# Patient Record
Sex: FEMALE | Race: BLACK OR AFRICAN AMERICAN
[De-identification: names, ages, dates, MRNs, and addresses within clinical notes are randomized per-mention and may not be internally consistent; named-entity substitution may affect disease eponyms.]

---

## 2017-02-23 ENCOUNTER — HOSPITAL ENCOUNTER (EMERGENCY)
Dept: HOSPITAL 68 - ERH | Age: 32
LOS: 1 days | End: 2017-02-24
Payer: COMMERCIAL

## 2017-02-23 DIAGNOSIS — J32.9: Primary | ICD-10-CM

## 2017-02-24 VITALS — DIASTOLIC BLOOD PRESSURE: 82 MMHG | SYSTOLIC BLOOD PRESSURE: 112 MMHG

## 2017-02-24 NOTE — ED INFLUENZA/URI COMPLAINT
History of Present Illness
 
General
Chief Complaint: Upper Respiratory Sx/Fever
Stated Complaint: URI SYMPTOMS
Source: patient
Exam Limitations: no limitations
 
Vital Signs & Intake/Output
Vital Signs & Intake/Output
 Vital Signs
 
 
Date Time Temp Pulse Resp B/P Pulse O2 O2 Flow FiO2
 
     Ox Delivery Rate 
 
02/24 0005 97.4 99 16 112/82 97 Room Air  
 
02/23 2231 97.1 102 16 113/80 96 Room Air  
 
 
Allergies
Coded Allergies:
Penicillins (Severe, LARYNGEAL EDEMA 12/19/16)
clindamycin (Severe, LARYNGEAL EDEMA 12/19/16)
doxycycline (Severe, LARYNGEAL EDEMA 12/19/16)
orange juice (MOUTH SWELLING, RASH 12/19/16)
 
Reconcile Medications
Azithromycin (Zithromax) 250 MG TABLET   1 DP PO AD sinsuitis
     2 the first day followed by 1 for days 2-5
Benztropine Mesylate 0.5 MG TABLET   0.5 MG PO 0800,2200 prevent muscle spasm
Citalopram Hydrobromide (Citalopram HBr) 20 MG TABLET   20 MG PO DAILY MENTAL 
HEALTH
Dicyclomine Hydrochloride (Bentyl) 10 MG CAPSULE   10 MG PO TID PRN ABDOMINAL 
PAIN  (Reported)
Gabapentin 300 MG CAPSULE   300 MG PO TID
Lamotrigine 25 MG TABLET   25 MG PO DAILY MENTAL HEALTH
[place] Carbonate 300 MG CAPSULE   300 MG PO 0800 MOOD STABILITY
Lithium Carbonate 600 MG CAPSULE   600 MG PO AT BEDTIME MOOD STABILITY
Mirtazapine (Remeron) 15 MG TABLET   7.5 MG PO AT BEDTIME SLEEP HELP
Mometasone Furoate (Nasonex) 50 MCG SPRAY.PUMP   2 SPRAY NASB DAILY PRN 
congestion
Risperidone (Risperdal) 1 MG TABLET   1 MG PO 0800 MENTAL HEALTH
Risperidone (Risperdal) 1 MG TABLET   3 MG PO 2200 MENTAL HEALTH
 
Triage Note:
TRIAGE; PT TO ED FOR FEELING SICK X1 WEEK. HAD
FEVER THAT STOPPED YESTERDAY. CONTINUES WITH
CONGESTION AND COUGH. STATES HAS BEEN USING OTC
MEDS BUT NOT HELPING MUCH.
Triage Nurses Notes Reviewed? yes
Onset: Gradual
Duration: week(s): (1)
Timing: remote history
Severity: moderate
Severity Numbers: 8
Prior Episodes/Possible Cause: occassional episodes
No Modifying Factors: none
Pregnant: No
Patient currently breastfeeds: No
HPI:
Patient is a 31-year-old female presenting to the emergency department she 
complaint of upper respiratory congestion, sore throat, ear fullness has been 
going on for the past one week.  She also reports intermittently productive 
cough of clear sputum.  Denies any nausea or vomiting.  Positive tactile fever 
and chills.  No sick contacts or recent travel.  She's been using over-the-
counter DayQuil and NyQuil with little to no relief.  Denies chest pain or 
palpitations.
(RAPHAEL TERAN)
 
Past History
 
Travel History
Traveled to [place] past 21 day No
 
Medical History
Any Pertinent Medical History? see below for history
Neurological: migraine
EENT: NONE
Cardiovascular: NONE
Respiratory: NONE
Gastrointestinal: NONE
Hepatic: NONE
Renal: NONE
Musculoskeletal: LUMBAR FRACTURE
Psychiatric: alcohol dependence, bipolar disease, depression
Endocrine: ELEVATED THYROID LEVELS WHICH HAVE NORMALIZED
Blood Disorders: NONE
Cancer(s): NONE
GYN/Reproductive: NONE
History of MRSA: No
History of VRE: No
History of CDIFF: No
Tetanus Vaccine: 08/18/12
 
Surgical History
Surgical History: non-contributory
 
Psychosocial History
Who do you live with Family
Services at Home None
What is your primary language English
Tobacco Use: Never used
 
Family History
Family History, If Any:
Relation not specified for:
  *No pertinent family history
 
Hx Contributory? No
(RAPHAEL TERAN)
 
Review of Systems
 
Review of Systems
Constitutional:
Reports: chills, fever, malaise. 
Comments
Review of systems: See HPI, All other systems negative.
Constitutional, no weight loss
HEENT: No visual changes 
Cardiovascular: No chest pain ,palpitation , orthopnea or ankle swelling
Skin, no jaundice no rashes
Respiratory: No dyspnea sputum or hemoptysis
GI: No nausea no vomiting
: No dysuria No hematuria
Muscle skeletal: no back pain, no neck pain,
Neurologic: No numbness no confusion
Psych: No stress anxiety 
Immunology: No splenectomy or history of AIDS
(RAPHAEL TERAN)
 
Physical Exam
 
Physical Exam
General Appearance: well developed/nourished, no apparent distress, alert, awake
, comfortable
Ears, Nose, Throat: nasal drainage, pharyngeal erythema
Comments:
Well-developed well-nourished person in no acute distress
HEENT: . Pupils equally round and reactive to light and accommodation. Nose is 
atraumatic. External auditory canal and Tympanic membranes clear. Pharynx is 
moderately erythematous, no exudate, clearing secretions without difficulty.  
Uvula midline.. No swelling or edema.  Clear nasal discharge bilaterally.  
Tenderness to palpation of the maxillary sinuses bilaterally.
Neck: Supple, no lymphadenopathy, normal range of motion without pain or 
tenderness
Back: Nontender
Cardiovascular: Regular rate and rhythms no murmurs rubs or gallops, normal JVP
Respiratory: Chest nontender. No respiratory distress.breath sounds clear to 
auscultation bilaterally
Extremity: No edema
Neuro: Alert oriented x3
Skin: No appreciable rash on exposed skin, skin is warm and dry.
Psych: Mood and affect is normal, memory and judgment is normal.
 
Core Measures
Severe Sepsis Present: No
Septic Shock Present: No
(RAPHAEL TERAN)
 
Progress
Differential Diagnosis: influenza, pharyngitis, sinusitis
Plan of Care:
Patient likely has sinusitis.  She'll be treated with antibiotics, nasal 
steroid.  Educated on increasing fluids.  Patient nontoxic.
Initial ED EKG: none
(RAPHAEL TERAN)
 
Departure
 
Departure
Time of Disposition: 2358
Disposition: HOME OR SELF CARE
Condition: Stable
Clinical Impression
Primary Impression: Sinusitis
Qualifiers:  Sinusitis location: unspecified location Chronicity: acute 
Recurrence: non-recurrent Qualified Code: J01.90 - Acute sinusitis, unspecified
Referrals:
KATELYNN ALEJANDRA (PCP/Family)
 
Additional Instructions:
Follow-up with your primary care physician on an appointment.  Take Z-Reymundo and 
Nasonex as prescribed.  Return for worsening symptoms or concerns.
Departure Forms:
Customer Survey
General Discharge Information
Prescriptions:
Current Visit Scripts
Azithromycin (Zithromax) 1 DP PO AD 
     #6 TAB 
     2 the first day followed by 1 for days 2-5
 
Mometasone Furoate (Nasonex) 2 SPRAY NASB DAILY PRN congestion
     #1 INHAL 
 
 
(RAPHAEL TERAN)
 
PA/NP Co-Sign Statement
Statement:
ED Attending supervision documentation-
 
[] I saw and evaluated the patient. I have also reviewed all the pertinent lab 
results and diagnostic results. I agree with the findings and the plan of care 
as documented in the PA's/NP's documentation. 
 
x I have reviewed the ED Record and agree with the PA's/NP's documentation.
 
[] Additions or exceptions (if any) to the PAs/NP's note and plan are 
summarized below:
[]
 
(GIUSEPPE JUNG,GABBI)

## 2018-01-27 ENCOUNTER — HOSPITAL ENCOUNTER (EMERGENCY)
Dept: HOSPITAL 68 - ERH | Age: 33
End: 2018-01-27
Payer: COMMERCIAL

## 2018-01-27 VITALS — DIASTOLIC BLOOD PRESSURE: 77 MMHG | SYSTOLIC BLOOD PRESSURE: 125 MMHG

## 2018-01-27 DIAGNOSIS — Z48.01: Primary | ICD-10-CM

## 2018-01-27 NOTE — ED UPPER/LOWER EXTREMITY COMPL
History of Present Illness
 
General
Chief Complaint: General Adult
Stated Complaint: RIGHT GROIN PAIN
Source: patient
Exam Limitations: no limitations
 
Vital Signs & Intake/Output
Vital Signs & Intake/Output
 Vital Signs
 
 
Date Time Temp Pulse Resp B/P B/P Pulse O2 O2 Flow FiO2
 
     Mean Ox Delivery Rate 
 
01/27 2036 98.3 94 20 127/83     
 
 
 
Allergies
Coded Allergies:
Penicillins (Severe, LARYNGEAL EDEMA 11/15/17)
clindamycin (Severe, LARYNGEAL EDEMA 11/15/17)
doxycycline (Severe, LARYNGEAL EDEMA 11/15/17)
orange juice (MOUTH SWELLING, RASH 11/15/17)
 
Reconcile Medications
Carbamazepine (Tegretol XR) 200 MG TAB.ER.12H   1 TAB PO BID MENTAL HEALTH  (
Reported)
Citalopram Hydrobromide (Citalopram HBr) 20 MG TABLET   20 MG PO DAILY MENTAL 
HEALTH
Cyclobenzaprine HCl 10 MG TABLET   1 TAB PO TID SPASMS
Gabapentin 100 MG CAPSULE   2 CAP PO TID MENTAL HEALTH  (Reported)
Meloxicam (Mobic) 15 MG TABLET   1 TAB PO DAILY PAIN
Mirtazapine (Remeron) 15 MG TABLET   2 TAB PO QHS MENTAL HEALTH  (Reported)
Prazosin Hydrochloride (Minipress) 1 MG CAPSULE   1 CAP PO QPM MENTAL HEALTH  (
Reported)
Risperidone (Risperdal) 3 MG TABLET   1 TAB PO QAM MENTAL HEALTH  (Reported)
Risperidone 3 MG TABLET   2 TAB PO QPM MENTAL HEALTH  (Reported)
Tramadol HCl 50 MG TABLET   1-2 TAB PO BIDP PRN pain
 
Triage Note:
PER PT HAD A CYST/ABSCESS IN GROIN
DRAINED AT [facility] ON THURSDAY, ? BARTHOLIN CYST,
HAD DRAIN PUT IN TOLD TO HAVE REMOVED TODAY BUT
PLACE IS NOT OPEN AND HAVING SEVERE PAIN TO AREA
Triage Nurses Notes Reviewed? yes
Onset: Gradual
Duration: week(s):
Timing: recent history
Severity: moderate
Pain/Injury Location:
Right: Other. 
Pregnant: No
Patient currently breastfeeds: No
HPI:
32-year-old female presents emergency department requesting packing removal from
abscess.  Patient states that she has had right groin abscess for over one week 
now.  Patient was started on Bactrim antibiotics, she finished 10 day course 
yesterday.  Abscess was drained 2 days ago with packing in place.  Patient 
reports severe pain at abscess site despite use of Vicodin pain medication.  
Patient has a follow-up scheduled with OB/GYN for further evaluation of this 
abscess.  The patient has been applying warm compress as directed.  The patient 
denies fevers, chills, abdominal pain, nausea, vomiting.
(Nathalie Alegre)
 
Past History
 
Travel History
Traveled to [place] past 21 day No
 
Medical History
Any Pertinent Medical History? see below for history
Neurological: migraine
EENT: NONE
Cardiovascular: NONE
Respiratory: NONE
Gastrointestinal: NONE
Hepatic: NONE
Renal: NONE
Musculoskeletal: LUMBAR FRACTURE
Psychiatric: alcohol dependence, bipolar disease, depression
Endocrine: ELEVATED THYROID LEVELS WHICH HAVE NORMALIZED
Blood Disorders: NONE
Cancer(s): NONE
GYN/Reproductive: NONE
History of MRSA: No
History of VRE: No
History of CDIFF: No
Tetanus Vaccine: 08/18/12
 
Surgical History
Surgical History: non-contributory
 
Psychosocial History
Who do you live with Family
Services at Home None
What is your primary language English
Tobacco Use: Current Daily Use
Daily Tobacco Use Amount/Type: => 5 Cigarettes daily
 
Family History
Family History, If Any:
Relation not specified for:
  *No pertinent family history
 
Hx Contributory? No
(Nathalie Alegre)
 
Review of Systems
 
Review of Systems
Constitutional:
Reports: no symptoms. 
EENTM:
Reports: no symptoms. 
Respiratory:
Reports: no symptoms. 
Cardiovascular:
Reports: no symptoms. 
Gastrointestinal/Abdominal:
Reports: no symptoms. 
Genitourinary:
Reports: no symptoms. 
Musculoskeletal:
Reports: no symptoms. 
Skin:
Reports: see HPI. 
Neurological/Psychological:
Reports: no symptoms. 
Hematologic/Endocrine:
Reports: no symptoms. 
Immunological:
Reports: no symptoms. 
All Other Systems: Reviewed and Negative
(Nathalie Alegre)
 
Physical Exam
 
Physical Exam
General Appearance: well developed/nourished, no apparent distress, alert, awake
Head: atraumatic, normal appearance
Eyes:
Bilateral: normal appearance. 
Ears, Nose, Throat: hearing grossly normal
Neck: normal inspection, supple, full range of motion
Cardiovascular/Respiratory: no respiratory distress
Back: normal inspection, normal range of motion
Leg Left: normal range of motion, normal inspection
Leg Right: normal range of motion, normal inspection
Neurologic/Tendon: normal sensation, normal motor functions, normal tendon 
functions
Skin: abscess to right labia major described below
Comments:
Genital: 1cm area of erythema, tenderness, fluctuance to right labia with 
incision and packing material present, no surrounding erythema
(Nathalie Alegre)
 
Progress
Differential Diagnosis: cellulitis, contusion, abscess, bartholin cyst
Plan of Care:
Packing removed.  No remaining fluctuance that would require second I&D at this 
time.  There is no surrounding cellulitis.  Patient instructed to continue warm 
compresses and Vicodin.  For added pain relief it was recommended she begin 
ibuprofen 800 mg with additional Tylenol 325 mg.  Patient to follow-up with OB/
GYN regarding abscess within the next 1-2 days.  Patient has stable vital signs,
she is nontoxic-appearing.  The patient agrees with the plan of care.
(Nathalie Alegre)
 
Departure
 
Departure
Disposition: HOME OR SELF CARE
Condition: Stable
Clinical Impression
Primary Impression: Abscess
Referrals:
Bibiana Santamaria (PCP/Family)
 
Additional Instructions:
Continue warm compresses twice daily.  Change dressings every day, keep area 
clean and dry.  Follow-up with specialist on Monday as scheduled.  Continue 
Vicodin as prescribed as needed for pain.  With this medication may take 800 mg 
ibuprofen 3 times a day and additional 325 mg Tylenol 4 times a day.  If you 
have worsening symptoms please return to the emergency department.
Departure Forms:
Customer Survey
General Discharge Information
(Nathalie Alegre)
 
PA/NP Co-Sign Statement
Statement:
ED Attending supervision documentation-
 
[] I saw and evaluated the patient. I have also reviewed all the pertinent lab 
results and diagnostic results. I agree with the findings and the plan of care 
as documented in the PA's/NP's documentation. 
 
[X] I have reviewed the ED Record and agree with the PA's/NP's documentation.
 
[] Additions or exceptions (if any) to the PAs/NP's note and plan are 
summarized below:
[]
 
(Tyrell JUNG,Perri)

## 2018-03-06 NOTE — HISTORY & PHYSICAL PRE-OP
General Information and HPI
History of Present Illness:
This patient is a 32-year-old  5 para 3 AB 2 with chronic menorrhagia who
desires NovaSure ablation.  Her periods of heavy lasting for 7 days and going 
through 6-8 pads per day saturation desires surgical treatment.  She has been 
unresponsive to hormonal manipulation.
 
Allergies/Medications
Allergies:
Coded Allergies:
Penicillins (Severe, LARYNGEAL EDEMA 18)
clindamycin (Severe, LARYNGEAL EDEMA 18)
doxycycline (Severe, LARYNGEAL EDEMA 18)
orange juice (MOUTH SWELLING, RASH 18)
 
Home Med list
Carbamazepine (Tegretol XR) 200 MG TAB.ER.12H   1 TAB PO BID MENTAL HEALTH  (
Reported)
Citalopram Hydrobromide (Citalopram HBr) 20 MG TABLET   20 MG PO DAILY MENTAL 
HEALTH
Cyclobenzaprine HCl 10 MG TABLET   1 TAB PO TID SPASMS
Gabapentin 100 MG CAPSULE   2 CAP PO TID MENTAL HEALTH  (Reported)
Meloxicam (Mobic) 15 MG TABLET   1 TAB PO DAILY PAIN
Mirtazapine (Remeron) 15 MG TABLET   2 TAB PO QHS MENTAL HEALTH  (Reported)
Prazosin Hydrochloride (Minipress) 1 MG CAPSULE   1 CAP PO QPM MENTAL HEALTH  (
Reported)
Risperidone (Risperdal) 3 MG TABLET   1 TAB PO QAM MENTAL HEALTH  (Reported)
Risperidone 3 MG TABLET   2 TAB PO QPM MENTAL HEALTH  (Reported)
Tramadol HCl 50 MG TABLET   1-2 TAB PO BIDP PRN pain
 
 
Past History
 
Medical History
Neurological: migraine
EENT: NONE
Cardiovascular: NONE
Respiratory: NONE
Gastrointestinal: NONE
Hepatic: NONE
Renal: NONE
Musculoskeletal: LUMBAR FRACTURE
Psychiatric: alcohol dependence, bipolar disease, depression
Endocrine: ELEVATED THYROID LEVELS WHICH HAVE NORMALIZED
Blood Disorders: NONE
Cancer(s): NONE
GYN/Reproductive: NONE
History of MRSA: No
History of VRE: No
History of CDIFF: No
Tetanus Vaccine: 12
 
Surgical History
Pertinent Surgical History: laparoscopy
 
Past Family/Social History
 
Family History
Relations & Conditions if any
Relation not specified for:
  *No pertinent family history
 
 
Psychosocial History
Services at Home None
Primary Language: English
 
Functional Ability
ADLs
Independent: dressing, eating, toileting, bathing. 
Ambulation: independent
IADLs
Independent: shopping, housework, finances, food prep, telephone, transportation
, medication admin. 
 
Review of Systems
 
Review of Systems
Constitutional:
Reports: no symptoms. 
EENTM:
Reports: no symptoms. 
Cardiovascular:
Reports: no symptoms. 
Respiratory:
Reports: no symptoms. 
GI:
Reports: no symptoms. 
Genitourinary:
Reports: no symptoms. 
Musculoskeletal:
Reports: no symptoms. 
Skin:
Reports: no symptoms. 
Neurological/Psychological:
Reports: no symptoms. 
Hematologic/Endocrine:
Reports: no symptoms. 
Immunologic/Allergic:
Reports: no symptoms. 
All Other Systems: Reviewed and Negative
 
Exam & Diagnostic Data
Last 24 Hrs of Vital Signs/I&O
 Intake & Output
 
 
  1600  0800  0000
 
Intake Total   
 
Output Total   
 
Balance   
 
    
 
Patient 148 lb  
 
Weight   
 
 
 
Physical Exam:
HEENT: Normocephalic atraumatic
Chest: Clear to auscultation bilaterally
Cardiovascular: Normal S1, S2
Abdomen: Soft nontender no mass
Pelvic: Deferred to the OR
Extremities: No clubbing cyanosis or edema
 
Assessment/Plan
Assessment/Plan:
Chronic menorrhagia
Plan D&C hysteroscopy NovaSure ablation
 
As Ranked By This Provider
Problem List:
 1. Menorrhagia

## 2018-03-07 ENCOUNTER — HOSPITAL ENCOUNTER (OUTPATIENT)
Dept: HOSPITAL 68 - STS | Age: 33
End: 2018-03-07
Attending: OBSTETRICS & GYNECOLOGY
Payer: COMMERCIAL

## 2018-03-07 VITALS — WEIGHT: 148 LBS | BODY MASS INDEX: 27.23 KG/M2 | HEIGHT: 62 IN

## 2018-03-07 DIAGNOSIS — F17.200: ICD-10-CM

## 2018-03-07 DIAGNOSIS — N92.1: Primary | ICD-10-CM

## 2018-04-05 ENCOUNTER — HOSPITAL ENCOUNTER (OUTPATIENT)
Dept: HOSPITAL 68 - ERH | Age: 33
Setting detail: OBSERVATION
LOS: 2 days | End: 2018-04-07
Attending: EMERGENCY MEDICINE | Admitting: EMERGENCY MEDICINE
Payer: COMMERCIAL

## 2018-04-05 VITALS — WEIGHT: 145 LBS | BODY MASS INDEX: 26.68 KG/M2 | HEIGHT: 62 IN

## 2018-04-05 VITALS — DIASTOLIC BLOOD PRESSURE: 60 MMHG | SYSTOLIC BLOOD PRESSURE: 103 MMHG

## 2018-04-05 DIAGNOSIS — F10.229: Primary | ICD-10-CM
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
## 2018-04-05 DIAGNOSIS — F31.30: ICD-10-CM

## 2018-04-05 DIAGNOSIS — M79.642: ICD-10-CM

## 2018-04-05 DIAGNOSIS — G43.909: ICD-10-CM

## 2018-04-05 DIAGNOSIS — F43.10: ICD-10-CM

## 2018-04-05 DIAGNOSIS — F39: ICD-10-CM

## 2018-04-05 LAB
ABSOLUTE GRANULOCYTE CT: 2.1 /CUMM (ref 1.4–6.5)
BASOPHILS # BLD: 0 /CUMM (ref 0–0.2)
BASOPHILS NFR BLD: 0.4 % (ref 0–2)
EOSINOPHIL # BLD: 0.1 /CUMM (ref 0–0.7)
EOSINOPHIL NFR BLD: 1.2 % (ref 0–5)
ERYTHROCYTE [DISTWIDTH] IN BLOOD BY AUTOMATED COUNT: 12.3 % (ref 11.5–14.5)
GRANULOCYTES NFR BLD: 49.6 % (ref 42.2–75.2)
HCT VFR BLD CALC: 39.7 % (ref 37–47)
LYMPHOCYTES # BLD: 1.9 /CUMM (ref 1.2–3.4)
MCH RBC QN AUTO: 32.8 PG (ref 27–31)
MCHC RBC AUTO-ENTMCNC: 35 G/DL (ref 33–37)
MCV RBC AUTO: 93.5 FL (ref 81–99)
MONOCYTES # BLD: 0.2 /CUMM (ref 0.1–0.6)
PLATELET # BLD: 376 /CUMM (ref 130–400)
PMV BLD AUTO: 6.8 FL (ref 7.4–10.4)
RED BLOOD CELL CT: 4.25 /CUMM (ref 4.2–5.4)
WBC # BLD AUTO: 4.3 /CUMM (ref 4.8–10.8)

## 2018-04-05 PROCEDURE — G0378 HOSPITAL OBSERVATION PER HR: HCPCS

## 2018-04-05 PROCEDURE — G0463 HOSPITAL OUTPT CLINIC VISIT: HCPCS

## 2018-04-05 PROCEDURE — G0480 DRUG TEST DEF 1-7 CLASSES: HCPCS

## 2018-04-05 NOTE — RADIOLOGY REPORT
EXAMINATION:
LEFT HAND 3 VIEWS
 
CLINICAL INFORMATION:
Left hand pain.
 
COMPARISON:
None.
 
TECHNIQUE:
PA, lateral, oblique views of the left hand were obtained.
 
FINDINGS:
There are no fractures or dislocations. There is no significant soft tissue
swelling.
 
IMPRESSION:
Unremarkable left hand radiographs.
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
## 2018-09-21 ENCOUNTER — HOSPITAL ENCOUNTER (EMERGENCY)
Dept: HOSPITAL 68 - ERH | Age: 33
End: 2018-09-21
Payer: COMMERCIAL

## 2018-09-21 VITALS — DIASTOLIC BLOOD PRESSURE: 79 MMHG | SYSTOLIC BLOOD PRESSURE: 120 MMHG

## 2018-09-21 DIAGNOSIS — F17.210: ICD-10-CM

## 2018-09-21 DIAGNOSIS — F31.9: ICD-10-CM

## 2018-09-21 DIAGNOSIS — R10.84: ICD-10-CM

## 2018-09-21 DIAGNOSIS — F32.9: ICD-10-CM

## 2018-09-21 DIAGNOSIS — R51: ICD-10-CM

## 2018-09-21 DIAGNOSIS — F10.20: ICD-10-CM

## 2018-09-21 DIAGNOSIS — R19.7: Primary | ICD-10-CM

## 2018-09-21 LAB
ABSOLUTE GRANULOCYTE CT: 3.9 /CUMM (ref 1.4–6.5)
BASOPHILS # BLD: 0 /CUMM (ref 0–0.2)
BASOPHILS NFR BLD: 0.5 % (ref 0–2)
EOSINOPHIL # BLD: 0.1 /CUMM (ref 0–0.7)
EOSINOPHIL NFR BLD: 1 % (ref 0–5)
ERYTHROCYTE [DISTWIDTH] IN BLOOD BY AUTOMATED COUNT: 12.3 % (ref 11.5–14.5)
GRANULOCYTES NFR BLD: 60.7 % (ref 42.2–75.2)
HCT VFR BLD CALC: 42.3 % (ref 37–47)
LYMPHOCYTES # BLD: 2.1 /CUMM (ref 1.2–3.4)
MCH RBC QN AUTO: 33.8 PG (ref 27–31)
MCHC RBC AUTO-ENTMCNC: 34.7 G/DL (ref 33–37)
MCV RBC AUTO: 97.5 FL (ref 81–99)
MONOCYTES # BLD: 0.3 /CUMM (ref 0.1–0.6)
PLATELET # BLD: 354 /CUMM (ref 130–400)
PMV BLD AUTO: 8.1 FL (ref 7.4–10.4)
RED BLOOD CELL CT: 4.34 /CUMM (ref 4.2–5.4)
WBC # BLD AUTO: 6.4 /CUMM (ref 4.8–10.8)

## 2018-09-21 NOTE — ED GI/GU/ABDOMINAL COMPLAINT
History of Present Illness
 
General
Chief Complaint: Abdominal Pain/Flank Pain
Stated Complaint: ABD PAIN, NAUSEA, VOMITING, DIARRHEA X 2DAYS
Source: patient
Exam Limitations: no limitations
 
Vital Signs & Intake/Output
Vital Signs & Intake/Output
 Vital Signs
 
 
Date Time Temp Pulse Resp B/P B/P Pulse O2 O2 Flow FiO2
 
     Mean Ox Delivery Rate 
 
09/21 2005 98.5 87 16 120/79  98 Room Air  
 
09/21 1738 97.7 89 18 160/100  98 Room Air  
 
 
 
Allergies
Coded Allergies:
Penicillins (Severe, LARYNGEAL EDEMA 03/06/18)
clindamycin (Severe, LARYNGEAL EDEMA 03/06/18)
doxycycline (Severe, LARYNGEAL EDEMA 03/06/18)
orange juice (MOUTH SWELLING, RASH 03/06/18)
 
Reconcile Medications
Carbamazepine (Tegretol XR) 200 MG TAB.ER.12H   1 TAB PO BID MENTAL HEALTH  (
Reported)
Citalopram Hydrobromide (Citalopram HBr) 20 MG TABLET   20 MG PO DAILY MENTAL 
HEALTH
Dicyclomine HCl 10 MG CAPSULE   1 CAP PO TID ABD PAIN
Gabapentin 100 MG CAPSULE   2 CAP PO TID MENTAL HEALTH  (Reported)
Mirtazapine (Remeron) 15 MG TABLET   2 TAB PO QHS MENTAL HEALTH  (Reported)
Prazosin Hydrochloride (Minipress) 1 MG CAPSULE   1 CAP PO QPM MENTAL HEALTH  (
Reported)
Risperidone (Risperdal) 3 MG TABLET   1 TAB PO QAM MENTAL HEALTH  (Reported)
Risperidone 3 MG TABLET   2 TAB PO QPM MENTAL HEALTH  (Reported)
 
Triage Note:
32 Y/O FEMALE C/O DIFFUSE ABDOMINAL PAIN
 (RADIATING MORE TO LLQ), AND N/V/D SINCE TUESDAY.
 PT REPORTS FEVERS AT HOME.  AFEBRILE.  PT REPORTS
 URINATING LESS THAN USUAL AND DARK COLOR TO URINE.
Triage Nurses Notes Reviewed? yes
Pregnant? n
Is pt currently breastfeeding? No
Onset: Abrupt
Duration: day(s):, constant
Timing: recent history
Location: generalized abdomen
Radiation: no radiation
HPI:
33-year-old female comes into the emergency room complaints of nausea and 
diarrhea and abdominal pain.  Symptoms began 4 days ago Tuesday morning.  
Symptoms progressed.  Denies any blood in her stool.  Denies any fever chills 
vomiting but feels nauseous.  She had a previous history of endometriosis with 
laparoscopy but denies any abdominal surgeries.  Denies any recent travel.  
Denies any suspicious foods.
 
Past History
 
Travel History
Traveled to [place] past 21 day No
 
Medical History
Any Pertinent Medical History? see below for history
Neurological: migraine
EENT: NONE
Cardiovascular: NONE
Respiratory: NONE
Gastrointestinal: NONE
Hepatic: NONE
Renal: NONE
Musculoskeletal: LUMBAR FRACTURE
Psychiatric: alcohol dependence, bipolar disease, depression
Endocrine: ELEVATED THYROID LEVELS WHICH HAVE NORMALIZED
Blood Disorders: NONE
Cancer(s): NONE
GYN/Reproductive: NONE
History of MRSA: No
History of VRE: No
History of CDIFF: No
Tetanus Vaccine: 08/18/12
 
Surgical History
Surgical History: laparoscopy
 
Psychosocial History
Who do you live with Family
Services at Home None
What is your primary language English
Tobacco Use: Current Daily Use
Daily Tobacco Use Amount/Type: => 5 Cigarettes daily
 
Family History
Family History, If Any:
Relation not specified for:
  *No pertinent family history
 
Hx Contributory? No
 
Review of Systems
 
Review of Systems
Constitutional:
Reports: no symptoms. 
EENTM:
Reports: no symptoms. 
Respiratory:
Reports: no symptoms. 
Cardiovascular:
Reports: no symptoms. 
GI:
Reports: see HPI. 
Genitourinary:
Reports: no symptoms. 
Musculoskeletal:
Reports: no symptoms. 
Skin:
Reports: no symptoms. 
Neurological/Psychological:
Reports: see HPI. 
Hematologic/Endocrine:
Reports: no symptoms. 
Immunologic/Allergic:
Reports: no symptoms. 
All Other Systems: Reviewed and Negative
 
Physical Exam
 
Physical Exam
General Appearance: well developed/nourished, no apparent distress, alert
Head: atraumatic, normal appearance
Eyes:
Bilateral: normal appearance. 
Ears, Nose, Throat, Mouth: hearing grossly normal, moist mucous membrane
Neck: normal inspection
Respiratory: no respiratory distress
Gastrointestinal: soft, tenderness (left abdomen), no guarding, no rebound 
tenderness
Back: normal inspection
Extremities: normal range of motion
Neurologic/Psych: awake, alert, oriented x 3, normal gait
Skin: intact, normal color
 
Core Measures
ACS in differential dx? No
Sepsis Present: No
Sepsis Focused Exam Completed? No
 
Progress
Differential Diagnosis: appendicitis, diverticulitis, gastritis, ischemic bowel,
kidney stone, pancreatitis, PUD/GERD, SBO, UTI/pyelo
Plan of Care:
 Orders
 
 
Procedure Date/time Status
 
URINE PREGNANCY 09/21 1736 Complete
 
URINALYSIS 09/21 1736 Complete
 
LIPASE 09/21 1736 Complete
 
LACTIC ACID 09/21 1736 Complete
 
HEPATIC FUNCTION PANEL 09/21 1736 Complete
 
CBC WITHOUT DIFFERENTIAL 09/21 1736 Complete
 
BASIC METABOLIC PANEL 09/21 1736 Complete
 
 
 Laboratory Tests
 
 
 
09/21/18 1838:
Urine Color YEL, Urine Clarity CLEAR, Urine pH 6.5, Ur Specific Gravity 1.010, 
Urine Protein NEG, Urine Ketones 15  H, Urine Nitrite NEG, Urine Bilirubin NEG, 
Urine Urobilinogen 0.2, Ur Leukocyte Esterase NEG, Ur Microscopic EXAM NOT 
REQUIRED, Urine Hemoglobin NEG, Urine Glucose NEG, Urine Pregnancy Test NEGATIVE
 
09/21/18 1753:
Anion Gap 11, Estimated GFR > 60, BUN/Creatinine Ratio 12.5, Glucose 89, Lactic 
Acid 1.1, Calcium 9.3, Total Bilirubin 0.4, Direct Bilirubin 0.3, AST 36, ALT 33
, Alkaline Phosphatase 105, Total Protein 8.0, Albumin 4.8, Lipase 33, CBC w 
Diff NO MAN DIFF REQ, RBC 4.34, MCV 97.5, MCH 33.8  H, MCHC 34.7, RDW 12.3, MPV 
8.1, Gran % 60.7, Lymphocytes % 33.1, Monocytes % 4.7, Eosinophils % 1.0, 
Basophils % 0.5, Absolute Granulocytes 3.9, Absolute Lymphocytes 2.1, Absolute 
Monocytes 0.3, Absolute Eosinophils 0.1, Absolute Basophils 0
 
Initial ED EKG: none
 
Departure
 
Departure
Disposition: HOME OR SELF CARE
Condition: Stable
Clinical Impression
Primary Impression: Diarrhea
Secondary Impressions: Abdominal pain, Headache
Referrals:
Bibiana Santamaria (PCP/Family)
 
Additional Instructions:
Follow-up with primary care doctor.  Return if any concerns worsening symptoms. 
Take Bentyl as prescribed.
 
Please go over all results of today's visit with your primary care doctor.  
Contact your primary care doctor to let them know you were here in the emergency
room.  There may be nonspecific findings which may not be related to your visit 
today here in the emergency room but may require further evaluation and chronic 
monitoring by your primary care doctor.  If you had a laceration today the 
chance of foreign body always remains. You should follow-up with your primary 
care doctor for recheck in 3-5 days for a wound check.  If you had an x-ray done
there is a chance that a fracture could have been missed on initial read and you
should follow-up with your primary care doctor for repeat x-rays if symptoms 
persist.  If your blood pressure was elevated here in the emergency room please 
have rechecked by [facility] primary care doctor within the next 48.  If you were 
prescribed a narcotic here in the emergency room or any type of controlled 
substances you're not allowed to drive while taking this medication or operate 
any type of heavy machinery.  Narcotics can make you feel lightheaded dizziness 
nausea and can cause constipation.  You may need to  a stool softener.  
Thank you for choosing [facility] emergency room.  Please return to the 
emergency room immediately if you have any other concerns worsening of symptoms.
 
Departure Forms:
Customer Survey
General Discharge Information
Prescriptions:
Current Visit Scripts
Dicyclomine HCl 1 CAP PO TID  
     #20 CAP 
 
 
Comments
9/21/2018 8:24:01 PM
 
Patient clinically looks well.  In no apparent distress.  Nontoxic appearing.  
No acute abdomen.  Shared decision making.  Do not feel CT scan is necessary at 
this time and patient agrees.  She developed a headache while she was here.  She
has a history of cluster headaches and reports it feels the same.  She was given
Toradol and Reglan and her headache resolved and she felt ready for discharge.